# Patient Record
Sex: FEMALE | Race: WHITE
[De-identification: names, ages, dates, MRNs, and addresses within clinical notes are randomized per-mention and may not be internally consistent; named-entity substitution may affect disease eponyms.]

---

## 2023-07-18 ENCOUNTER — HOSPITAL ENCOUNTER (EMERGENCY)
Dept: HOSPITAL 47 - EC | Age: 33
Discharge: HOME | End: 2023-07-18
Payer: COMMERCIAL

## 2023-07-18 VITALS
DIASTOLIC BLOOD PRESSURE: 68 MMHG | HEART RATE: 101 BPM | SYSTOLIC BLOOD PRESSURE: 107 MMHG | RESPIRATION RATE: 18 BRPM | TEMPERATURE: 97.6 F

## 2023-07-18 DIAGNOSIS — F17.200: ICD-10-CM

## 2023-07-18 DIAGNOSIS — F41.9: ICD-10-CM

## 2023-07-18 DIAGNOSIS — F15.90: ICD-10-CM

## 2023-07-18 DIAGNOSIS — Z20.822: ICD-10-CM

## 2023-07-18 DIAGNOSIS — Z79.899: ICD-10-CM

## 2023-07-18 DIAGNOSIS — N39.0: Primary | ICD-10-CM

## 2023-07-18 LAB
ALBUMIN SERPL-MCNC: 4.5 G/DL (ref 3.5–5)
ALP SERPL-CCNC: 54 U/L (ref 38–126)
ALT SERPL-CCNC: 19 U/L (ref 4–34)
ANION GAP SERPL CALC-SCNC: 12 MMOL/L
AST SERPL-CCNC: 29 U/L (ref 14–36)
BASOPHILS # BLD AUTO: 0 K/UL (ref 0–0.2)
BASOPHILS NFR BLD AUTO: 0 %
BUN SERPL-SCNC: 20 MG/DL (ref 7–17)
CALCIUM SPEC-MCNC: 9.3 MG/DL (ref 8.4–10.2)
CHLORIDE SERPL-SCNC: 109 MMOL/L (ref 98–107)
CO2 SERPL-SCNC: 20 MMOL/L (ref 22–30)
EOSINOPHIL # BLD AUTO: 0.1 K/UL (ref 0–0.7)
EOSINOPHIL NFR BLD AUTO: 1 %
ERYTHROCYTE [DISTWIDTH] IN BLOOD BY AUTOMATED COUNT: 5.16 M/UL (ref 3.8–5.4)
ERYTHROCYTE [DISTWIDTH] IN BLOOD: 13.6 % (ref 11.5–15.5)
GLUCOSE SERPL-MCNC: 104 MG/DL (ref 74–99)
HCT VFR BLD AUTO: 43 % (ref 34–46)
HGB BLD-MCNC: 14.4 GM/DL (ref 11.4–16)
LYMPHOCYTES # SPEC AUTO: 2.2 K/UL (ref 1–4.8)
LYMPHOCYTES NFR SPEC AUTO: 21 %
MCH RBC QN AUTO: 27.9 PG (ref 25–35)
MCHC RBC AUTO-ENTMCNC: 33.5 G/DL (ref 31–37)
MCV RBC AUTO: 83.3 FL (ref 80–100)
MONOCYTES # BLD AUTO: 0.6 K/UL (ref 0–1)
MONOCYTES NFR BLD AUTO: 6 %
NEUTROPHILS # BLD AUTO: 7.3 K/UL (ref 1.3–7.7)
NEUTROPHILS NFR BLD AUTO: 70 %
PH UR: 5.5 [PH] (ref 5–8)
PLATELET # BLD AUTO: 262 K/UL (ref 150–450)
POTASSIUM SERPL-SCNC: 4.2 MMOL/L (ref 3.5–5.1)
PROT SERPL-MCNC: 7.9 G/DL (ref 6.3–8.2)
PROT UR QL: (no result)
RBC UR QL: 1 /HPF (ref 0–5)
SODIUM SERPL-SCNC: 141 MMOL/L (ref 137–145)
SP GR UR: 1.03 (ref 1–1.03)
SQUAMOUS UR QL AUTO: 11 /HPF (ref 0–4)
UROBILINOGEN UR QL STRIP: 2 MG/DL (ref ?–2)
WBC # BLD AUTO: 10.4 K/UL (ref 3.8–10.6)
WBC #/AREA URNS HPF: 6 /HPF (ref 0–5)

## 2023-07-18 PROCEDURE — 99284 EMERGENCY DEPT VISIT MOD MDM: CPT

## 2023-07-18 PROCEDURE — 87636 SARSCOV2 & INF A&B AMP PRB: CPT

## 2023-07-18 PROCEDURE — 86780 TREPONEMA PALLIDUM: CPT

## 2023-07-18 PROCEDURE — 85025 COMPLETE CBC W/AUTO DIFF WBC: CPT

## 2023-07-18 PROCEDURE — 36415 COLL VENOUS BLD VENIPUNCTURE: CPT

## 2023-07-18 PROCEDURE — 81001 URINALYSIS AUTO W/SCOPE: CPT

## 2023-07-18 PROCEDURE — 80053 COMPREHEN METABOLIC PANEL: CPT

## 2023-07-18 NOTE — ED
General Adult HPI





- General


Chief complaint: Abdominal Pain


Stated complaint: Abd pain


Time Seen by Provider: 07/18/23 16:30


Source: patient, RN notes reviewed


Mode of arrival: ambulatory





- History of Present Illness


Initial comments: 


33-year-old  female with past medical history significant for 

polysubstance abuse presents to the emergency department with a chief complaint 

of abdominal pain.  Patient reports worsening abdominal pain that started last 

night.  She reports she was unable to get a good nights rest because of the 

pain.  She is not taking anything for his symptoms.  She does report a couple 

symptoms of nausea.  She does report there are sick contacts at her residence.  

Denies known fevers, cough, sore throat, chest pain, shortness of breath, 

melena, hematochezia, dysuria.





- Related Data


                                Home Medications











 Medication  Instructions  Recorded  Confirmed


 


Acetaminophen Tab [Tylenol] 650 mg PO Q4H PRN MDD 4 doses 07/18/23 07/18/23


 


Calcium Magnesium Zinc W/ D 1 tab PO TID PRN 07/18/23 07/18/23


 


Chlorpheniramine Maleate 4 mg PO Q4H PRN 07/18/23 07/18/23





[Chlor-Trimeton]   


 


Hyoscyamine Sulfate [Levsin] 0.125 mg PO QID PRN 07/18/23 07/18/23


 


Ibuprofen [Motrin Ib] 600 mg PO Q6H PRN 07/18/23 07/18/23


 


Loperamide [Imodium] 4 mg PO QID PRN MDD 8 capsules 07/18/23 07/18/23


 


Mirtazapine [Remeron] 15 mg PO HS 07/18/23 07/18/23


 


Multivitamins, Thera [Multivitamin 1 tab PO DAILY 07/18/23 07/18/23





(formulary)]   


 


Mylanta   30 ml PO Q4H PRN 07/18/23 07/18/23


 


Thiamine [Vitamin B-1] 100 mg PO DAILY 07/18/23 07/18/23


 


cloNIDine HCL [Catapres] 0.1 - 0.3 mg PO Q4H PRN 07/18/23 07/18/23


 


ondansetron HCL [Zofran] 8 mg PO Q6H PRN 07/18/23 07/18/23








                                  Previous Rx's











 Medication  Instructions  Recorded


 


Cephalexin [Keflex] 500 mg PO Q6HR #20 cap 07/18/23


 


Ibuprofen [Motrin] 800 mg PO Q8H #30 tab 07/18/23


 


Ondansetron Odt [Zofran Odt] 4 mg PO Q8HR PRN #10 tab 07/18/23











                                    Allergies











Allergy/AdvReac Type Severity Reaction Status Date / Time


 


No Known Allergies Allergy   Verified 07/18/23 18:10














Review of Systems


ROS Statement: 


Those systems with pertinent positive or pertinent negative responses have been 

documented in the HPI.





ROS Other: All systems not noted in ROS Statement are negative.





Past Medical History


Additional Past Medical History / Comment(s): spleenic bruise, hep c positive, 

tuberculosis 1996


History of Any Multi-Drug Resistant Organisms: None Reported


Past Surgical History: No Surgical Hx Reported


Past Psychological History: Anxiety


Smoking Status: Current every day smoker


Past Alcohol Use History: None Reported


Past Drug Use History: Methamphetamine





General Exam





- General Exam Comments


Initial Comments: 





General: Alert, in no acute distress


Head: atraumatic normocephalic.  Eyes PERRL, EOMI intact, mucous membranes moist




Respiratory: Lungs clear to auscultation bilaterally


Cardiovascular: Heart rate regular rate and rhythm


Abdominal: Soft without guarding or rebound


Extremities: Normal inspection with full range of motion and normal capillary 

refill


Neuroogic: alert and oriented 3, CN II-XII intact, able to ambulate with steady

gait 


Skin: warm dry and intact with normal color





Course


                                   Vital Signs











  07/18/23





  16:16


 


Temperature 97.6 F


 


Pulse Rate 101 H


 


Respiratory 18





Rate 


 


Blood Pressure 107/68


 


O2 Sat by Pulse 99





Oximetry 














Medical Decision Making





- Medical Decision Making





Was pt. sent in by a medical professional or institution (Dr. PA, NP, urgent 

care, hospital, or nursing home...) When possible be specific


@  -[No]


Did you speak to anyone other than the patient for history (EMS, parent, family,

 police, friend...)? What history was obtained from this source 


@  -[No]


Did you review nursing and triage notes (agree or disagree)?  Why? 


@  -[I reviewed and agree with nursing and triage notes]


Were old charts reviewed (outside hosp., previous admission, EMS record, old 

EKG, old radiological studies, urgent care reports/EKG's, nursing home records)?

Report findings 


@  -[No old charts were reviewed]


Differential Diagnosis (chest pain, altered mental status, abdominal pain women,

abdominal pain men, vaginal bleeding, weakness, fever, dyspnea, syncope, 

headache, dizziness, GI bleed, back pain, seizure, CVA, palpatations, mental 

health, musculoskeletal)? 


@  -[not applicable]


EKG interpreted by me (3pts min.).


@  -[As above]


X-rays interpreted by me (1pt min.).


@  -[None done]


CT interpreted by me (1pt min.).


@  -[None done]


U/S interpreted by me (1pt. min.).


@  -[None done]


What testing was considered but not performed or refused? (CT, X-rays, U/S, 

labs)? Why?


@  -[None]


What meds were considered but not given or refused? Why?


@  -[None]


Did you discuss the management of the patient with other professionals 

(professionals i.e. , PA, NP, lab, RT, psych nurse, , , 

teacher, , )? Give summary


@  -[No]


Was smoking cessation discussed for >3mins.?


@  -[No]


Was critical care preformed (if so, how long)?


@  -[No]


Were there social determinants of health that impacted care today? How? 

(Homelessness, low income, unemployed, alcoholism, drug addiction, 

transportation, low edu. Level, literacy, decrease access to med. care, alf, 

rehab)?


@  -[No]


Was there de-escalation of care discussed even if they declined (Discuss DNR or 

withdrawal of care, Hospice)? DNR status


@  -[No]


What co-morbidities impacted this encounter? (DM, HTN, Smoking, COPD, CAD, 

Cancer, CVA, ARF, Chemo, Hep., AIDS, mental health diagnosis, sleep apnea, 

morbid obesity)?


@  -[None]


Was patient admitted / discharged? Hospital course, mention meds given and 

route, prescriptions, significant lab abnormalities, going to OR and other 

pertinent info.


@  -Discharged.  33-year-old  female with past polysubstance the piece 

presents to the emergency department with lower abdominal pain.  Patient had a 

thorough history and physical exam performed on the ED.  Physical exam 

essentially unremarkable.  Heart rate regular rate and rhythm, lungs are to 

auscultation bilaterally abdomen is soft mild tenderness that is generalized.  

Patient had lab work performed which was essentially negative.  Due to patient 

being hard stick lab work was delayed while being obtained. No IV access was 

established.  Patient is agreeable with the plan for discharge home at this 

time.  Patient will be given a prescription for Keflex.  Patient discharged in 

stable condition.  Case discussed with Dr. Cesped[facility] who agrees with plan of

 care


Undiagnosed new problem with uncertain prognosis?


@  -[No]


Drug Therapy requiring intensive monitoring for toxicity (Heparin, Nitro, 

Insulin, Cardizem)?


@  -[No]


Were any procedures done?


@  -[No]


Diagnosis/symptom?


@  -Abdominal Pain 


- Urinary Tract Infection 


Acute, or Chronic, or Acute on Chronic?


@  -Acute 


Uncomplicated (without systemic symptoms) or Complicated (systemic symptoms)?


@  -Uncomplicated 


Side effects of treatment?


@  -[No]


Exacerbation, Progression, or Severe Exacerbation?


@  -[No]


Poses a threat to life or bodily function? How? (Chest pain, USA, MI, pneumonia,

 PE, COPD, DKA, ARF, appy, cholecystitis, CVA, Diverticulitis, Homicidal, 

Suicidal, threat to staff... and all critical care pts)


@  -Low likelihood 





- Lab Data


Result diagrams: 


                                 07/18/23 17:16





                                 07/18/23 17:16


                                   Lab Results











  07/18/23 07/18/23 07/18/23 Range/Units





  17:16 17:16 17:16 


 


WBC  10.4    (3.8-10.6)  k/uL


 


RBC  5.16    (3.80-5.40)  m/uL


 


Hgb  14.4    (11.4-16.0)  gm/dL


 


Hct  43.0    (34.0-46.0)  %


 


MCV  83.3    (80.0-100.0)  fL


 


MCH  27.9    (25.0-35.0)  pg


 


MCHC  33.5    (31.0-37.0)  g/dL


 


RDW  13.6    (11.5-15.5)  %


 


Plt Count  262    (150-450)  k/uL


 


MPV  8.8    


 


Neutrophils %  70    %


 


Lymphocytes %  21    %


 


Monocytes %  6    %


 


Eosinophils %  1    %


 


Basophils %  0    %


 


Neutrophils #  7.3    (1.3-7.7)  k/uL


 


Lymphocytes #  2.2    (1.0-4.8)  k/uL


 


Monocytes #  0.6    (0-1.0)  k/uL


 


Eosinophils #  0.1    (0-0.7)  k/uL


 


Basophils #  0.0    (0-0.2)  k/uL


 


Sodium    141  (137-145)  mmol/L


 


Potassium    4.2  (3.5-5.1)  mmol/L


 


Chloride    109 H  ()  mmol/L


 


Carbon Dioxide    20 L  (22-30)  mmol/L


 


Anion Gap    12  mmol/L


 


BUN    20 H  (7-17)  mg/dL


 


Creatinine    0.72  (0.52-1.04)  mg/dL


 


Est GFR (CKD-EPI)AfAm    >90  (>60 ml/min/1.73 sqM)  


 


Est GFR (CKD-EPI)NonAf    >90  (>60 ml/min/1.73 sqM)  


 


Glucose    104 H  (74-99)  mg/dL


 


Calcium    9.3  (8.4-10.2)  mg/dL


 


Total Bilirubin    0.7  (0.2-1.3)  mg/dL


 


AST    29  (14-36)  U/L


 


ALT    19  (4-34)  U/L


 


Alkaline Phosphatase    54  ()  U/L


 


Total Protein    7.9  (6.3-8.2)  g/dL


 


Albumin    4.5  (3.5-5.0)  g/dL


 


Urine Color   Yellow   


 


Urine Appearance   Cloudy H   (Clear)  


 


Urine pH   5.5   (5.0-8.0)  


 


Ur Specific Gravity   1.035   (1.001-1.035)  


 


Urine Protein   1+ H   (Negative)  


 


Urine Glucose (UA)   Negative   (Negative)  


 


Urine Ketones   Trace H   (Negative)  


 


Urine Blood   Negative   (Negative)  


 


Urine Nitrite   Negative   (Negative)  


 


Urine Bilirubin   Negative   (Negative)  


 


Urine Urobilinogen   2.0   (<2.0)  mg/dL


 


Ur Leukocyte Esterase   Large H   (Negative)  


 


Urine RBC   1   (0-5)  /hpf


 


Urine WBC   6 H   (0-5)  /hpf


 


Ur Squamous Epith Cells   11 H   (0-4)  /hpf


 


Urine Bacteria   Occasional H   (None)  /hpf


 


Urine Mucus   Many H   (None)  /hpf


 


Influenza Type A (PCR)     (Not Detectd)  


 


Influenza Type B (PCR)     (Not Detectd)  


 


RSV (PCR)     (Not Detectd)  


 


SARS-CoV-2 (PCR)     (Not Detectd)  














  07/18/23 Range/Units





  17:16 


 


WBC   (3.8-10.6)  k/uL


 


RBC   (3.80-5.40)  m/uL


 


Hgb   (11.4-16.0)  gm/dL


 


Hct   (34.0-46.0)  %


 


MCV   (80.0-100.0)  fL


 


MCH   (25.0-35.0)  pg


 


MCHC   (31.0-37.0)  g/dL


 


RDW   (11.5-15.5)  %


 


Plt Count   (150-450)  k/uL


 


MPV   


 


Neutrophils %   %


 


Lymphocytes %   %


 


Monocytes %   %


 


Eosinophils %   %


 


Basophils %   %


 


Neutrophils #   (1.3-7.7)  k/uL


 


Lymphocytes #   (1.0-4.8)  k/uL


 


Monocytes #   (0-1.0)  k/uL


 


Eosinophils #   (0-0.7)  k/uL


 


Basophils #   (0-0.2)  k/uL


 


Sodium   (137-145)  mmol/L


 


Potassium   (3.5-5.1)  mmol/L


 


Chloride   ()  mmol/L


 


Carbon Dioxide   (22-30)  mmol/L


 


Anion Gap   mmol/L


 


BUN   (7-17)  mg/dL


 


Creatinine   (0.52-1.04)  mg/dL


 


Est GFR (CKD-EPI)AfAm   (>60 ml/min/1.73 sqM)  


 


Est GFR (CKD-EPI)NonAf   (>60 ml/min/1.73 sqM)  


 


Glucose   (74-99)  mg/dL


 


Calcium   (8.4-10.2)  mg/dL


 


Total Bilirubin   (0.2-1.3)  mg/dL


 


AST   (14-36)  U/L


 


ALT   (4-34)  U/L


 


Alkaline Phosphatase   ()  U/L


 


Total Protein   (6.3-8.2)  g/dL


 


Albumin   (3.5-5.0)  g/dL


 


Urine Color   


 


Urine Appearance   (Clear)  


 


Urine pH   (5.0-8.0)  


 


Ur Specific Gravity   (1.001-1.035)  


 


Urine Protein   (Negative)  


 


Urine Glucose (UA)   (Negative)  


 


Urine Ketones   (Negative)  


 


Urine Blood   (Negative)  


 


Urine Nitrite   (Negative)  


 


Urine Bilirubin   (Negative)  


 


Urine Urobilinogen   (<2.0)  mg/dL


 


Ur Leukocyte Esterase   (Negative)  


 


Urine RBC   (0-5)  /hpf


 


Urine WBC   (0-5)  /hpf


 


Ur Squamous Epith Cells   (0-4)  /hpf


 


Urine Bacteria   (None)  /hpf


 


Urine Mucus   (None)  /hpf


 


Influenza Type A (PCR)  Not Detected  (Not Detectd)  


 


Influenza Type B (PCR)  Not Detected  (Not Detectd)  


 


RSV (PCR)  Not Detected  (Not Detectd)  


 


SARS-CoV-2 (PCR)  Not Detected  (Not Detectd)  














Disposition


Clinical Impression: 


 Abdominal pain, Urinary tract infection





Disposition: HOME SELF-CARE


Condition: Stable


Instructions (If sedation given, give patient instructions):  Urinary Tract 

Infection in Women (DC)


Additional Instructions: 


Please return to the nearest emergency department if symptoms worsen or persist


Prescriptions: 


Cephalexin [Keflex] 500 mg PO Q6HR #20 cap


Ibuprofen [Motrin] 800 mg PO Q8H #30 tab


Ondansetron Odt [Zofran Odt] 4 mg PO Q8HR PRN #10 tab


 PRN Reason: Nausea


Is patient prescribed a controlled substance at d/c from ED?: No


Referrals: 


None,Stated [Primary Care Provider] - 1-2 days


Time of Disposition: 18:45